# Patient Record
Sex: MALE | Race: WHITE | Employment: FULL TIME | URBAN - NONMETROPOLITAN AREA
[De-identification: names, ages, dates, MRNs, and addresses within clinical notes are randomized per-mention and may not be internally consistent; named-entity substitution may affect disease eponyms.]

---

## 2024-06-04 ENCOUNTER — HOSPITAL ENCOUNTER (EMERGENCY)
Age: 39
Discharge: HOME | End: 2024-06-04
Attending: EMERGENCY MEDICINE
Payer: COMMERCIAL

## 2024-06-04 VITALS
SYSTOLIC BLOOD PRESSURE: 143 MMHG | RESPIRATION RATE: 18 BRPM | OXYGEN SATURATION: 97 % | DIASTOLIC BLOOD PRESSURE: 96 MMHG | TEMPERATURE: 98.5 F | HEART RATE: 80 BPM

## 2024-06-04 DIAGNOSIS — M77.02 GOLFERS ELBOW OF LEFT UPPER EXTREMITY: Primary | ICD-10-CM

## 2024-06-04 PROCEDURE — 99283 EMERGENCY DEPT VISIT LOW MDM: CPT

## 2024-06-04 RX ORDER — PREDNISONE 20 MG/1
10 TABLET ORAL 2 TIMES DAILY
Qty: 7 TABLET | Refills: 0 | Status: SHIPPED | OUTPATIENT
Start: 2024-06-04 | End: 2024-06-11

## 2024-06-04 NOTE — ED PROVIDER NOTES
[facility] EMERGENCY DEPT      CHIEF COMPLAINT       Chief Complaint   Patient presents with    Arm Injury       Nurses Notes reviewed and I agree except as noted in the HPI.      HISTORY OF PRESENT ILLNESS    Saul Poon is a 38 y.o. male who presents with complaint of pain to medial elbow, patient states that he was attempting to pull a heavy door when he had a snapping/instant pain to medial aspect of his elbow, stated that he had pain on and off for the past 1 week.  States that today he was attempting to lift lunch box and he had a shooting pain to the region, he has no pain to his wrist, shoulder, pain worse with pronation of the wrist  Onset: Acute  Duration: 1 week  Timing: Persistent  Location of Pain: Lateral elbow  Intesity/severity: Moderate pain  Modifying Factors: Worse with pronation of the wrist  Relieved by;  Previous Episodes;  Tx Before arrival: None    PAST MEDICAL HISTORY    has no past medical history on file.    SURGICAL HISTORY      has no past surgical history on file.    CURRENT MEDICATIONS       Discharge Medication List as of 6/4/2024  4:48 PM          ALLERGIES     has No Known Allergies.    FAMILY HISTORY     has no family status information on file.    family history is not on file.    SOCIAL HISTORY          PHYSICAL EXAM     INITIAL VITALS:  oral temperature is 98.5 °F (36.9 °C). His blood pressure is 143/96 (abnormal) and his pulse is 80. His respiration is 18 and oxygen saturation is 97%.    Physical Exam   Constitutional:  well-developed and well-nourished.   HENT: Head: Normocephalic, atraumatic, Bilateral external ears normal, Oropharynx mosit, No oral exudates, Nose normal.   Eyes: PERRL, EOMI, Conjunctiva normal, No discharge. No scleral icterus  Neck: Normal range of motion, No tenderness, Supple  Cardiovascular: Normal rate, regular rhythm, S1 normal and S2 normal.  Exam reveals no gallop.    Pulmonary/Chest: Effort normal and breath sounds normal. No accessory muscle usage

## 2024-06-04 NOTE — ED NOTES
Pt to the ED via self. Patient presents with complaints of a left forearm injury that occurred at work at the end of May. 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 Patient is alert and oriented x 4. Respirations are regular and unlabored. Call light within reach.

## 2024-06-04 NOTE — ED NOTES
According to our [employer] his employer does not require him to have a urine drug or alcohol screening done.